# Patient Record
Sex: FEMALE | Race: WHITE | Employment: OTHER | URBAN - METROPOLITAN AREA
[De-identification: names, ages, dates, MRNs, and addresses within clinical notes are randomized per-mention and may not be internally consistent; named-entity substitution may affect disease eponyms.]

---

## 2024-09-02 ENCOUNTER — OFFICE VISIT (OUTPATIENT)
Dept: URGENT CARE | Age: 69
End: 2024-09-02

## 2024-09-02 VITALS
DIASTOLIC BLOOD PRESSURE: 70 MMHG | HEIGHT: 64 IN | OXYGEN SATURATION: 97 % | HEART RATE: 54 BPM | WEIGHT: 166 LBS | BODY MASS INDEX: 28.34 KG/M2 | SYSTOLIC BLOOD PRESSURE: 116 MMHG | TEMPERATURE: 97 F
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
## 2024-09-02 DIAGNOSIS — T14.8XXA PUNCTURE WOUND: Primary | ICD-10-CM

## 2024-09-02 RX ORDER — ESCITALOPRAM OXALATE 20 MG/1
20 TABLET ORAL DAILY
COMMUNITY
Start: 2022-10-05

## 2024-09-02 RX ORDER — DOXAZOSIN 2 MG/1
2 TABLET ORAL 2 TIMES DAILY
COMMUNITY
Start: 2023-10-05

## 2024-09-02 RX ORDER — ATORVASTATIN CALCIUM 40 MG/1
40 TABLET, FILM COATED ORAL DAILY
COMMUNITY
Start: 2022-10-05

## 2024-09-02 RX ORDER — CARVEDILOL 25 MG/1
25 TABLET ORAL 2 TIMES DAILY WITH MEALS
COMMUNITY
Start: 2024-05-03

## 2024-09-02 NOTE — PATIENT INSTRUCTIONS
Take antibiotic as prescribed:    Change dressings as needed.     You may keep applying topical antibiotic ointment.     Monitor closely for significant increased redness, swelling, onset of fever or significant worsening pain. Return or go to ED if this occurs.     Also, return if there is lack of improvement or worsening despite at 3 days of antibiotics.

## 2024-09-02 NOTE — PROGRESS NOTES
Lorie Mchugh (:  1955) is a 68 y.o. female,  here for evaluation of the following chief complaint(s): Other (Pt was playing with her dog last week and her dog bit her on right ring finger. Not getting any better )    Lorie Mchugh is a New patient.   I have reviewed the patient's medications; see Medication Reconciliation.    ASSESSMENT/PLAN:  Diagnosis:     ICD-10-CM    1. Puncture wound  T14.8XXA       2. Dog bite, initial encounter  W54.0XXA             Patient was seen at Urgent Care today for Dog bite. Bite is to the palmar surface of the right 4th finger.   There is evidence of active wound infection/cellulitis. This is fairly isolated and mild at this time. I do not have concern for flexor tenosynovitis at this time but pt is advised to closely monitor for worsening symptoms including redness, swelling, pain and decreased movement.  Treatment with oral antibiotic should be sufficient at this time. She is advised to start abx today.   Pt is prescribed: Augmentin    There is indication for suture.   Rabies vaccination known and up to date. No indication for rabies vaccination at this time.    Provided basic wound care precautions and STRONG RETURN PRECAUTIONS.   Patient was discharged home with follow-up and return precautions.     Patient's initial blood pressure was elevated greater than 120/80. BP was rechecked prior to discharge and did decrease below 120/80. Therefore, referral to PCP is not required at this time.     Results:  No results found for any visits on 24.        SUBJECTIVE/OBJECTIVE:  HPI:   This is a 68 y.o. female that presents today with complaint of: Dog bite that occurred  last week (about 5-6 days ago)  Dog was known. Dog vaccination status is: up to date  Pt was bit on the right 4th finger. She did not notice any significant pain or swelling until the last 2 days or so. This seems to have worsened today. She reports swelling to the finger and pain with flexion